# Patient Record
Sex: FEMALE
[De-identification: names, ages, dates, MRNs, and addresses within clinical notes are randomized per-mention and may not be internally consistent; named-entity substitution may affect disease eponyms.]

---
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
## 2024-03-21 ENCOUNTER — OFFICE VISIT (OUTPATIENT)
Dept: OBGYN CLINIC | Facility: CLINIC | Age: 19
End: 2024-03-21
Payer: COMMERCIAL

## 2024-03-21 VITALS
HEART RATE: 81 BPM | HEIGHT: 55 IN | SYSTOLIC BLOOD PRESSURE: 106 MMHG | WEIGHT: 97.63 LBS | DIASTOLIC BLOOD PRESSURE: 64 MMHG | BODY MASS INDEX: 22.59 KG/M2

## 2024-03-21 DIAGNOSIS — Z11.3 SCREENING EXAMINATION FOR STD (SEXUALLY TRANSMITTED DISEASE): ICD-10-CM

## 2024-03-21 DIAGNOSIS — Z01.419 WELL WOMAN EXAM WITH ROUTINE GYNECOLOGICAL EXAM: Primary | ICD-10-CM

## 2024-03-21 PROCEDURE — 87491 CHLMYD TRACH DNA AMP PROBE: CPT | Performed by: OBSTETRICS & GYNECOLOGY

## 2024-03-21 PROCEDURE — 87591 N.GONORRHOEAE DNA AMP PROB: CPT | Performed by: OBSTETRICS & GYNECOLOGY

## 2024-03-21 PROCEDURE — 99385 PREV VISIT NEW AGE 18-39: CPT | Performed by: OBSTETRICS & GYNECOLOGY

## 2024-03-21 RX ORDER — RANITIDINE HCL 75 MG
75 TABLET ORAL
COMMUNITY

## 2024-03-21 RX ORDER — CETIRIZINE HYDROCHLORIDE 10 MG/1
10 CAPSULE, LIQUID FILLED ORAL
COMMUNITY

## 2024-03-21 RX ORDER — EPINEPHRINE 0.3 MG/.3ML
1 INJECTION SUBCUTANEOUS
COMMUNITY

## 2024-03-21 NOTE — PROGRESS NOTES
[facility]  Obstetrics and Gynecology  History & Physical    CC: Patient is a new patient and here for a well woman exam     Subjective:     HPI: Laura Sandoval is a 18 year old  female here for a well women exam. Patient reports she began going to the gym about 1.5 years ago and notes sometimes during ovulation or intense cardio she notes some spotting. Not bothersome to patient. No pain. She reports menses used to be regular but now irregular with activity. She reports menses was 5 days in the past but now 3 days. HMB x 2 and light the 3rd day. She reports occasional 4th day. Menses are monthly.     OB History:  OB History    Para Term  AB Living   0 0 0 0 0 0   SAB IAB Ectopic Multiple Live Births   0 0 0 0 0       Gyne History:  Menarche: 12  Period Cycle (Days): 28-30 days  Period Duration (Days): 3 days  Period Flow: heavy-light  Hx Prior Abnormal Pap: No  Pap Result Notes: Pt has never had a pap  Patient's last menstrual period was 2024.      denies history of STDs (non-HPV).   Sexual history: Active? yes  Birth control? none    Meds:  Current Outpatient Medications on File Prior to Visit   Medication Sig Dispense Refill    EPINEPHrine (EPIPEN 2-MARIELENA) 0.3 MG/0.3ML Injection Solution Auto-injector 1 Device.      Cetirizine HCl (ZYRTEC ALLERGY) 10 MG Oral Cap Take 10 mg by mouth. (Patient not taking: Reported on 3/21/2024)      raNITIdine HCl (ZANTAC 75) 75 MG Oral Tab Take 1 tablet (75 mg total) by mouth. (Patient not taking: Reported on 3/21/2024)      Multiple Vitamins-Minerals (MULTIVITAMIN OR) Take by mouth. (Patient not taking: Reported on 3/21/2024)      predniSONE 10 MG Oral Tab 40 mg po qd x 1 day, 30 mg po qd x 1 day, 20 mg po qd x 1 day, 10 mg po qd x  1 day, stop (Patient not taking: Reported on 3/21/2024) 10 tablet 1     No current facility-administered medications on file prior to visit.       All:  No Known Allergies    PMH:  History reviewed. No pertinent past  medical history.    Immunization History:     There is no immunization history on file for this patient.    PSH:  No past surgical history on file.    Social History:  Social History     Socioeconomic History    Marital status: Single     Spouse name: Not on file    Number of children: Not on file    Years of education: Not on file    Highest education level: Not on file   Occupational History    Not on file   Tobacco Use    Smoking status: Never    Smokeless tobacco: Never   Vaping Use    Vaping Use: Never used   Substance and Sexual Activity    Alcohol use: Never    Drug use: Never    Sexual activity: Yes     Partners: Male   Other Topics Concern     Service Not Asked    Blood Transfusions Not Asked    Caffeine Concern Yes    Occupational Exposure Not Asked    Hobby Hazards Not Asked    Sleep Concern Not Asked    Stress Concern Not Asked    Weight Concern Not Asked    Special Diet Not Asked    Back Care Not Asked    Exercise Yes    Bike Helmet Not Asked    Seat Belt Yes    Self-Exams Not Asked   Social History Narrative    Not on file     Social Determinants of Health     Financial Resource Strain: Not on file   Food Insecurity: Not on file   Transportation Needs: Not on file   Physical Activity: Not on file   Stress: Not on file   Social Connections: Not on file   Housing Stability: Not on file         Patient feels unsafe or threatened?: denies    Abuse: denies physical, sexual or mental.     Family History:  History reviewed. No pertinent family history.    Health maintenance:  Mammogram (age 39 and q1-2yr): n/a  Colonoscopy (age 44 and q10yr): n/a    Review of Systems:  General: no complaints per category. See HPI for additional information.   Breast: no complaints per category. See HPI for additional information.   Respiratory: no complaints per category. See HPI for additional information.   Cardiovascular: no complaints per category. See HPI for additional information.   GI: no complaints per  category. See HPI for additional information.   : no complaints per category. See HPI for additional information.   Heme: no complaints per category. See HPI for additional information.       Objective:     Vitals:    24 1304   BP: 106/64   Pulse: 81   Weight: 97 lb 9.6 oz (44.3 kg)   Height: 55\"         Body mass index is 22.68 kg/m².    General: AAO.NAD.   CVS exam: normal peripheral perfusion  Chest: non-labored breathing, no tachypnea   Breast:  symmetric, no dominant or suspicious mass, no skin or nipple changes and no axillary adenopathy  Abdominal exam:  soft, nontender, nondistended  Pelvic exam:   VULVA: normal appearing vulva with no masses, tenderness or lesions  PERINEUM: normal appearing, no lesions   URETHRAL MEATUS:  normal appearing, no lesions   VAGINA: normal appearing vagina with normal color and discharge, no lesions  CERVIX: normal appearing cervix without discharge or lesions  UTERUS: uterus is normal size, shape, consistency and nontender  ADNEXA: normal adnexa in size, nontender and no masses  PERIRECTAL: normal appearing, no lesions   Ext: non-tender, no edema    Assessment:     Laura Sandoval is a 18 year old  female here for a well women exam.         Plan:     Problem List Items Addressed This Visit    None  Visit Diagnoses       Screening examination for STD (sexually transmitted disease)    -  Primary    Relevant Orders    Chlamydia/Gc Amplification    RPR W/CONF [00931] [Q]    HIV-1/HIV-2 Single Assay [Q]    HEPATITIS PANEL, ACUTE [81955] [Q]                Cervical cancer screening  - discussion held with the patient about ASCCP guidelines  - first pap smear at age 20  Health maintenance  - encouraged to maintain weight at healthy BMI  - discussed importance of exercise and healthy eating  - self breast exam instructions provided  - STD screening d/w patient and new orders provided including GC/CHL sent   -Recommend condom use to decrease the risk of sexually transmitted  diseases and unplanned pregnancy  -Recommend prenatal vitamin daily, discussed importance of folic acid      All of the findings and plan were discussed with the patient.  She notes understanding and agrees with the plan of care.  All questions were answered to the best of my ability at this time.      RTC in 1 year for a well woman exam or sooner if needed     Gela Soria MD   [facility] - OBGYN      Discussed with patient that there will not be further notification of normal or benign results other than receiving results on mychart. A Recommendi message or telephone call will be placed by the physician and/or office staff if results are abnormal.     Note to patient and family   The 21st Century Cures Act makes medical notes available to patients in the interest of transparency.  However, please be advised that this is a medical document.  It is intended as [id number] communication.  It is written and medical language may contain abbreviations or verbiage that are technical and unfamiliar.  It may appear blunt or direct.  Medical documents are intended to carry relevant information, facts as evident, and the clinical opinion of the practitioner.        This note could include assistance by Dragon voice recognition. Errors in content may be related to improper recognition by the system; efforts to review and correct have been done but errors may still exist.

## 2024-03-22 LAB
C TRACH DNA SPEC QL NAA+PROBE: NEGATIVE
N GONORRHOEA DNA SPEC QL NAA+PROBE: NEGATIVE

## 2024-09-23 ENCOUNTER — PATIENT MESSAGE (OUTPATIENT)
Dept: OBGYN CLINIC | Facility: CLINIC | Age: 19
End: 2024-09-23

## 2024-09-23 NOTE — TELEPHONE ENCOUNTER
From: Laura Sandoval  To: Gela Soria  Sent: 9/23/2024 2:48 PM CDT  Subject: Lab request    Hello!    Just out of curiosity I received the pending lab request, when was that requested, I wasn’t aware and that’s why it hasn’t been done… is it just precautionary lab that needs to be tested or is something abnormal? And when should it be completed by?    Thanks

## 2025-03-01 ENCOUNTER — APPOINTMENT (OUTPATIENT)
Dept: CT IMAGING | Facility: HOSPITAL | Age: 20
End: 2025-03-01
Attending: EMERGENCY MEDICINE
Payer: COMMERCIAL

## 2025-03-01 ENCOUNTER — HOSPITAL ENCOUNTER (EMERGENCY)
Facility: HOSPITAL | Age: 20
Discharge: HOME | End: 2025-03-01
Attending: EMERGENCY MEDICINE
Payer: COMMERCIAL

## 2025-03-01 VITALS
OXYGEN SATURATION: 99 % | SYSTOLIC BLOOD PRESSURE: 119 MMHG | TEMPERATURE: 98 F | DIASTOLIC BLOOD PRESSURE: 81 MMHG | HEIGHT: 58 IN | WEIGHT: 101.63 LBS | HEART RATE: 98 BPM | RESPIRATION RATE: 16 BRPM | BODY MASS INDEX: 21.33 KG/M2

## 2025-03-01 DIAGNOSIS — R51.9 CHRONIC NONINTRACTABLE HEADACHE, UNSPECIFIED HEADACHE TYPE: Primary | ICD-10-CM

## 2025-03-01 DIAGNOSIS — G89.29 CHRONIC NONINTRACTABLE HEADACHE, UNSPECIFIED HEADACHE TYPE: Primary | ICD-10-CM

## 2025-03-01 LAB
ALBUMIN SERPL-MCNC: 5.2 G/DL (ref 3.2–4.8)
ALBUMIN/GLOB SERPL: 1.6 {RATIO} (ref 1–2)
ALP LIVER SERPL-CCNC: 75 U/L
ALT SERPL-CCNC: 12 U/L
ANION GAP SERPL CALC-SCNC: 6 MMOL/L (ref 0–18)
AST SERPL-CCNC: 24 U/L (ref ?–34)
B-HCG UR QL: NEGATIVE
BASOPHILS # BLD AUTO: 0.04 X10(3) UL (ref 0–0.2)
BASOPHILS NFR BLD AUTO: 0.7 %
BILIRUB SERPL-MCNC: 0.4 MG/DL (ref 0.3–1.2)
BUN BLD-MCNC: 16 MG/DL (ref 9–23)
CALCIUM BLD-MCNC: 10.5 MG/DL (ref 8.7–10.6)
CHLORIDE SERPL-SCNC: 103 MMOL/L (ref 98–112)
CO2 SERPL-SCNC: 28 MMOL/L (ref 21–32)
CREAT BLD-MCNC: 0.74 MG/DL
EGFRCR SERPLBLD CKD-EPI 2021: 119 ML/MIN/1.73M2 (ref 60–?)
EOSINOPHIL # BLD AUTO: 0.1 X10(3) UL (ref 0–0.7)
EOSINOPHIL NFR BLD AUTO: 1.7 %
ERYTHROCYTE [DISTWIDTH] IN BLOOD BY AUTOMATED COUNT: 13.6 %
GLOBULIN PLAS-MCNC: 3.3 G/DL (ref 2–3.5)
GLUCOSE BLD-MCNC: 95 MG/DL (ref 70–99)
HCT VFR BLD AUTO: 42.9 %
HGB BLD-MCNC: 14.8 G/DL
IMM GRANULOCYTES # BLD AUTO: 0.01 X10(3) UL (ref 0–1)
IMM GRANULOCYTES NFR BLD: 0.2 %
LYMPHOCYTES # BLD AUTO: 1.75 X10(3) UL (ref 1–4)
LYMPHOCYTES NFR BLD AUTO: 28.9 %
MCH RBC QN AUTO: 29.4 PG (ref 26–34)
MCHC RBC AUTO-ENTMCNC: 34.5 G/DL (ref 31–37)
MCV RBC AUTO: 85.1 FL
MONOCYTES # BLD AUTO: 0.47 X10(3) UL (ref 0.1–1)
MONOCYTES NFR BLD AUTO: 7.8 %
NEUTROPHILS # BLD AUTO: 3.68 X10 (3) UL (ref 1.5–7.7)
NEUTROPHILS # BLD AUTO: 3.68 X10(3) UL (ref 1.5–7.7)
NEUTROPHILS NFR BLD AUTO: 60.7 %
OSMOLALITY SERPL CALC.SUM OF ELEC: 285 MOSM/KG (ref 275–295)
PLATELET # BLD AUTO: 229 10(3)UL (ref 150–450)
POTASSIUM SERPL-SCNC: 3.9 MMOL/L (ref 3.5–5.1)
PROT SERPL-MCNC: 8.5 G/DL (ref 5.7–8.2)
RBC # BLD AUTO: 5.04 X10(6)UL
SODIUM SERPL-SCNC: 137 MMOL/L (ref 136–145)
WBC # BLD AUTO: 6.1 X10(3) UL (ref 4–11)

## 2025-03-01 PROCEDURE — 80053 COMPREHEN METABOLIC PANEL: CPT | Performed by: EMERGENCY MEDICINE

## 2025-03-01 PROCEDURE — 99284 EMERGENCY DEPT VISIT MOD MDM: CPT

## 2025-03-01 PROCEDURE — 96374 THER/PROPH/DIAG INJ IV PUSH: CPT

## 2025-03-01 PROCEDURE — 96361 HYDRATE IV INFUSION ADD-ON: CPT

## 2025-03-01 PROCEDURE — 85025 COMPLETE CBC W/AUTO DIFF WBC: CPT | Performed by: EMERGENCY MEDICINE

## 2025-03-01 PROCEDURE — 70450 CT HEAD/BRAIN W/O DYE: CPT | Performed by: EMERGENCY MEDICINE

## 2025-03-01 PROCEDURE — 96375 TX/PRO/DX INJ NEW DRUG ADDON: CPT

## 2025-03-01 PROCEDURE — 81025 URINE PREGNANCY TEST: CPT

## 2025-03-01 RX ORDER — ACETAMINOPHEN 500 MG
750 TABLET ORAL ONCE
Status: COMPLETED | OUTPATIENT
Start: 2025-03-01 | End: 2025-03-01

## 2025-03-01 RX ORDER — KETOROLAC TROMETHAMINE 30 MG/ML
30 INJECTION, SOLUTION INTRAMUSCULAR; INTRAVENOUS ONCE
Status: COMPLETED | OUTPATIENT
Start: 2025-03-01 | End: 2025-03-01

## 2025-03-01 RX ORDER — ONDANSETRON 2 MG/ML
4 INJECTION INTRAMUSCULAR; INTRAVENOUS ONCE
Status: COMPLETED | OUTPATIENT
Start: 2025-03-01 | End: 2025-03-01

## 2025-03-01 RX ORDER — DIPHENHYDRAMINE HYDROCHLORIDE 50 MG/ML
50 INJECTION INTRAMUSCULAR; INTRAVENOUS ONCE
Status: COMPLETED | OUTPATIENT
Start: 2025-03-01 | End: 2025-03-01

## 2025-03-01 NOTE — DISCHARGE INSTRUCTIONS
Tylenol and/or ibuprofen as needed for discomfort.  Push fluids and rest.  I recommend following up with neurology for evaluation of chronic recurring headaches.  Call for appointment.  Follow-up with PMD.  Call for appointment  Return immediately if symptoms worsen or other concerns develop.

## 2025-03-01 NOTE — ED PROVIDER NOTES
Patient Seen in: [facility] Emergency Department      History     Chief Complaint   Patient presents with    Nasal Congestion     Stated Complaint: head pressure    Subjective:   HPI      Patient is a 19-year-old says that she has had a recurrent headache for about 2 weeks.  She has had some nasal congestion and they thought was sinusitis.  She has been on Augmentin for 3 days and has not seen any improvement.  Also thought it might be her wisdom teeth and she saw oral surgery and they felt that she may need to have the room but they are not causing the problem right now.  Patient says she continues to have a headache and occasionally will have a little bit of a scotoma in the left lower visual field.  No vomiting.  No dizziness.  Mild congestion.  Mild facial sinus pressure sensation.  Patient denies chest or abdominal pain.      Objective:     History reviewed. No pertinent past medical history.           History reviewed. No pertinent surgical history.             Social History     Socioeconomic History    Marital status: Single   Tobacco Use    Smoking status: Never    Smokeless tobacco: Never   Vaping Use    Vaping status: Never Used   Substance and Sexual Activity    Alcohol use: Never    Drug use: Never    Sexual activity: Yes     Partners: Male   Other Topics Concern    Caffeine Concern Yes    Exercise Yes    Seat Belt Yes     Social Drivers of Health      Received from [facility]    Housing Stability                  Physical Exam     ED Triage Vitals [03/01/25 1318]   /81   Pulse 98   Resp 16   Temp 97.6 °F (36.4 °C)   Temp src Temporal   SpO2 99 %   O2 Device None (Room air)       Current Vitals:   Vital Signs  BP: 119/81  Pulse: 98  Resp: 16  Temp: 97.6 °F (36.4 °C)  Temp src: Temporal    Oxygen Therapy  SpO2: 99 %  O2 Device: None (Room air)        Physical Exam  GENERAL: Patient is awake, alert, active and interactive.  HEENT: Posterior pharynx shows no erythema or  exudate.  Uvula midline.  No drooling or stridor.  I do not detect any dental caries or signs of dental abscess or swelling near the wisdom teeth.  Normal movement of the jaw.  Conjunctiva are clear.  Pupils are equal round reactive to light.  Tympanic membrane's are pearly white bilaterally.  Normal light reflex and normal landmarks.  Neck is supple with no pain to movement.  CHEST: Patient is breathing comfortably.  Lungs clear  HEART: Regular rate and rhythm no murmur  ABDOMEN: nondistended, nontender  EXTREMITIES: Normal capillary refill.  SKIN: Well perfused, without cyanosis.  No rashes.  NEUROLOGIC: No focal deficits visualized.  Cranials 2 through 12 intact.  Normal gait.  Negative Romberg.    ED Course     Labs Reviewed   COMP METABOLIC PANEL (14) - Abnormal; Notable for the following components:       Result Value    Total Protein 8.5 (*)     Albumin 5.2 (*)     All other components within normal limits   POCT PREGNANCY URINE - Normal   CBC WITH DIFFERENTIAL WITH PLATELET            CT BRAIN OR HEAD (CPT=70450)    Result Date: 3/1/2025  PROCEDURE:  CT BRAIN OR HEAD (73127)  COMPARISON:  None.  INDICATIONS:  head pressure  TECHNIQUE:  Noncontrast CT scanning is performed through the brain. Dose reduction techniques were used. Dose information is transmitted to the ACR (American College of Radiology) NRDR (National Radiology Data Registry) which includes the Dose Index Registry.  PATIENT STATED HISTORY: (As transcribed by Technologist)  Patient states headache.    FINDINGS:  VENTRICLES/SULCI:  Ventricles and sulci are normal in size.  INTRACRANIAL:  There are no abnormal extraaxial fluid collections.  There is no midline shift.  There are no intraparenchymal brain abnormalities.  There is nothing specific for acute infarct.  There is no hemorrhage or mass lesion.  SINUSES:           No sign of acute sinusitis.  MASTOIDS:          No sign of acute inflammation. SKULL:             No evidence for fracture or  osseous abnormality. OTHER:             None.            CONCLUSION:  No acute intracranial abnormality identified.    LOCATION:  [facility]   Dictated by (CST): Remi Steiner MD on 3/01/2025 at 3:16 PM     Finalized by (CST): Remi Steiner MD on 3/01/2025 at 3:16 PM        Patient IV access started was given a bolus normal saline as well as IV Toradol, Zofran, Benadryl, oral Tylenol.  After fluid medication patient said he felt much better.     CT scan shows no sinusitis or other significant cranial abnormality.  MDM      Patient has chronic recurring headaches of unknown source.  I think the patient well-appearing now and can be discharged safely but I do think that follow-up with neurology would be useful.  I recommend following up with neurology and calling for an appointment.    Patient was screened and evaluated during this visit.   As a treating physician attending to the patient, I determined, within reasonable clinical confidence and prior to discharge, that an emergency medical condition was not or was no longer present.  There was no indication for further evaluation, treatment or admission on an emergency basis.  Comprehensive verbal and written discharge and follow-up instructions were provided to help prevent relapse or worsening.    Patient was instructed to follow-up with the primary care provider for further evaluation and treatment, but to return immediately to the ER for worsening, concerning, new, changing, or persisting symptoms.    I discussed my assessment and plan and answered all questions prior to discharge.  Patient/family expressed understanding and agreement with the plan.      Patient is alert, interactive, and in no distress upon discharge.    This report has been produced using speech recognition software and may contain errors related to that system including, but not limited to, errors in grammar, punctuation, and spelling, as well as words and phrases that possibly may have been  recognized inappropriately.  If there are any questions or concerns, contact the dictating provider for clarification.        Medical Decision Making      Disposition and Plan     Clinical Impression:  1. Chronic nonintractable headache, unspecified headache type         Disposition:  Discharge  3/1/2025  4:16 pm    Follow-up:  Magen Garcia MD  [address] [facility] [address]  [phone]    Schedule an appointment as soon as possible for a visit      Lonnie Pineda MD  [address] [place] [id number]  [phone]    Schedule an appointment as soon as possible for a visit      [facility] Emergency Department  [address] [facility] [address]  [phone]  Follow up  Immediately if symptoms worsen, increased concerns          Medications Prescribed:  Discharge Medication List as of 3/1/2025  4:34 PM              Supplementary Documentation:

## 2025-03-01 NOTE — ED INITIAL ASSESSMENT (HPI)
Pt states sinus pressure x 2 weeks.  Pt states consistent HA.  Pt had xrays by oral sx and was given an abx for sinus infection.  Pt states she has been taking for 3 days without relief.  Pt denies fevers.